# Patient Record
Sex: FEMALE | Race: WHITE
[De-identification: names, ages, dates, MRNs, and addresses within clinical notes are randomized per-mention and may not be internally consistent; named-entity substitution may affect disease eponyms.]

---

## 2018-03-16 ENCOUNTER — HOSPITAL ENCOUNTER (INPATIENT)
Dept: HOSPITAL 26 - MLD | Age: 38
LOS: 2 days | Discharge: HOME | End: 2018-03-18
Attending: OBSTETRICS & GYNECOLOGY | Admitting: OBSTETRICS & GYNECOLOGY
Payer: SELF-PAY

## 2018-03-16 VITALS — HEIGHT: 59 IN | BODY MASS INDEX: 24.6 KG/M2 | WEIGHT: 122 LBS

## 2018-03-16 VITALS — DIASTOLIC BLOOD PRESSURE: 81 MMHG | SYSTOLIC BLOOD PRESSURE: 123 MMHG

## 2018-03-16 DIAGNOSIS — Z3A.39: ICD-10-CM

## 2018-03-16 LAB
ALBUMIN FLD-MCNC: 2.6 G/DL (ref 3.4–5)
ANION GAP SERPL CALCULATED.3IONS-SCNC: 15.2 MMOL/L (ref 8–16)
APPEARANCE UR: CLEAR
AST SERPL-CCNC: 19 U/L (ref 15–37)
BASOPHILS # BLD AUTO: 0.1 K/UL (ref 0–0.22)
BASOPHILS NFR BLD AUTO: 1 % (ref 0–2)
BILIRUB SERPL-MCNC: 0.2 MG/DL (ref 0–1)
BILIRUB UR QL STRIP: NEGATIVE
BUN SERPL-MCNC: 8 MG/DL (ref 7–18)
CHLORIDE SERPL-SCNC: 107 MMOL/L (ref 98–107)
CO2 SERPL-SCNC: 20.6 MMOL/L (ref 21–32)
COLOR UR: YELLOW
CREAT SERPL-MCNC: 0.7 MG/DL (ref 0.6–1.3)
DEPRECATED D DIMER PPP-ACNC: 1500 NG/ML (ref 0–400)
EOSINOPHIL # BLD AUTO: 0.1 K/UL (ref 0–0.4)
EOSINOPHIL NFR BLD AUTO: 1.2 % (ref 0–4)
ERYTHROCYTE [DISTWIDTH] IN BLOOD BY AUTOMATED COUNT: 13.5 % (ref 11.6–13.7)
GFR SERPL CREATININE-BSD FRML MDRD: 122 ML/MIN (ref 90–?)
GLUCOSE SERPL-MCNC: 89 MG/DL (ref 74–106)
GLUCOSE UR STRIP-MCNC: NEGATIVE MG/DL
HCT VFR BLD AUTO: 35.1 % (ref 36–48)
HGB BLD-MCNC: 11.7 G/DL (ref 12–16)
HGB UR QL STRIP: NEGATIVE
LEUKOCYTE ESTERASE UR QL STRIP: NEGATIVE
LYMPHOCYTES # BLD AUTO: 1.9 K/UL (ref 2.5–16.5)
LYMPHOCYTES NFR BLD AUTO: 18.1 % (ref 20.5–51.1)
MCH RBC QN AUTO: 30 PG (ref 27–31)
MCHC RBC AUTO-ENTMCNC: 33 G/DL (ref 33–37)
MCV RBC AUTO: 91 FL (ref 80–94)
MONOCYTES # BLD AUTO: 1 K/UL (ref 0.8–1)
MONOCYTES NFR BLD AUTO: 9.4 % (ref 1.7–9.3)
NEUTROPHILS # BLD AUTO: 7.4 K/UL (ref 1.8–7.7)
NEUTROPHILS NFR BLD AUTO: 70.3 % (ref 42.2–75.2)
NITRITE UR QL STRIP: NEGATIVE
PH UR STRIP: 6 [PH] (ref 5–9)
PLATELET # BLD AUTO: 317 K/UL (ref 140–450)
POTASSIUM SERPL-SCNC: 3.8 MMOL/L (ref 3.5–5.1)
PROTHROMBIN TIME: 9 SECS (ref 10.8–13.4)
RBC # BLD AUTO: 3.86 MIL/UL (ref 4.2–5.4)
SODIUM SERPL-SCNC: 139 MMOL/L (ref 136–145)
WBC # BLD AUTO: 10.5 K/UL (ref 4.8–10.8)

## 2018-03-17 PROCEDURE — 0KQM0ZZ REPAIR PERINEUM MUSCLE, OPEN APPROACH: ICD-10-PCS | Performed by: OBSTETRICS & GYNECOLOGY

## 2018-03-18 LAB
BASOPHILS NFR BLD MANUAL: 0 % (ref 0–2)
EOSINOPHIL NFR BLD MANUAL: 0 % (ref 0–4)
ERYTHROCYTE [DISTWIDTH] IN BLOOD BY AUTOMATED COUNT: 13.5 % (ref 11.6–13.7)
HCT VFR BLD AUTO: 33 % (ref 36–48)
HGB BLD-MCNC: 11.1 G/DL (ref 12–16)
LYMPHOCYTES NFR BLD MANUAL: 10 % (ref 20–46)
MCH RBC QN AUTO: 30 PG (ref 27–31)
MCHC RBC AUTO-ENTMCNC: 34 G/DL (ref 33–37)
MCV RBC AUTO: 91 FL (ref 80–94)
MONOCYTES NFR BLD MANUAL: 7 % (ref 5–12)
PLATELET # BLD AUTO: 279 K/UL (ref 140–450)
RBC # BLD AUTO: 3.64 MIL/UL (ref 4.2–5.4)
WBC # BLD AUTO: 17.6 K/UL (ref 4.8–10.8)

## 2018-03-18 NOTE — NUR
PATIENT HAS BEEN SCREENED AND CATEGORIZED AS LOW NUTRITION RISK. PATIENT WILL BE SEEN WITHIN 
7 DAYS OF ADMISSION.



03/25/18



JENNIFER SPAIN MS, RDN

## 2018-06-10 ENCOUNTER — HOSPITAL ENCOUNTER (INPATIENT)
Dept: HOSPITAL 26 - MED | Age: 38
LOS: 3 days | Discharge: HOME | DRG: 463 | End: 2018-06-13
Attending: INTERNAL MEDICINE | Admitting: INTERNAL MEDICINE
Payer: COMMERCIAL

## 2018-06-10 VITALS — DIASTOLIC BLOOD PRESSURE: 79 MMHG | SYSTOLIC BLOOD PRESSURE: 118 MMHG

## 2018-06-10 VITALS — BODY MASS INDEX: 20.16 KG/M2 | HEIGHT: 59 IN | WEIGHT: 100 LBS

## 2018-06-10 VITALS — DIASTOLIC BLOOD PRESSURE: 94 MMHG | SYSTOLIC BLOOD PRESSURE: 166 MMHG

## 2018-06-10 DIAGNOSIS — N39.0: Primary | ICD-10-CM

## 2018-06-10 DIAGNOSIS — Z88.8: ICD-10-CM

## 2018-06-10 DIAGNOSIS — E87.6: ICD-10-CM

## 2018-06-10 DIAGNOSIS — F41.9: ICD-10-CM

## 2018-06-10 DIAGNOSIS — R45.851: ICD-10-CM

## 2018-06-10 DIAGNOSIS — Z79.899: ICD-10-CM

## 2018-06-10 DIAGNOSIS — Z28.21: ICD-10-CM

## 2018-06-10 DIAGNOSIS — Z78.1: ICD-10-CM

## 2018-06-10 DIAGNOSIS — Z88.4: ICD-10-CM

## 2018-06-10 LAB
ALBUMIN FLD-MCNC: 3.8 G/DL (ref 3.4–5)
ANION GAP SERPL CALCULATED.3IONS-SCNC: 13 MMOL/L (ref 8–16)
APAP SERPL-MCNC: < 0.5 UG/ML (ref 10–30)
APPEARANCE UR: (no result)
AST SERPL-CCNC: 27 U/L (ref 15–37)
BARBITURATES UR QL SCN: (no result) NG/ML
BASOPHILS # BLD AUTO: 0 K/UL (ref 0–0.22)
BASOPHILS NFR BLD AUTO: 0.2 % (ref 0–2)
BENZODIAZ UR QL SCN: (no result) NG/ML
BILIRUB SERPL-MCNC: 0.4 MG/DL (ref 0–1)
BILIRUB UR QL STRIP: NEGATIVE
BUN SERPL-MCNC: 5 MG/DL (ref 7–18)
BZE UR QL SCN: (no result) NG/ML
CANNABINOIDS UR QL SCN: (no result) NG/ML
CHLORIDE SERPL-SCNC: 105 MMOL/L (ref 98–107)
CO2 SERPL-SCNC: 25.3 MMOL/L (ref 21–32)
COLOR UR: YELLOW
CREAT SERPL-MCNC: 0.9 MG/DL (ref 0.6–1.3)
EOSINOPHIL # BLD AUTO: 0 K/UL (ref 0–0.4)
EOSINOPHIL NFR BLD AUTO: 0 % (ref 0–4)
ERYTHROCYTE [DISTWIDTH] IN BLOOD BY AUTOMATED COUNT: 13.1 % (ref 11.6–13.7)
GFR SERPL CREATININE-BSD FRML MDRD: 91 ML/MIN (ref 90–?)
GLUCOSE SERPL-MCNC: 117 MG/DL (ref 74–106)
GLUCOSE UR STRIP-MCNC: NEGATIVE MG/DL
HCT VFR BLD AUTO: 41.2 % (ref 36–48)
HGB BLD-MCNC: 13.7 G/DL (ref 12–16)
HGB UR QL STRIP: (no result)
LEUKOCYTE ESTERASE UR QL STRIP: NEGATIVE
LYMPHOCYTES # BLD AUTO: 0.8 K/UL (ref 2.5–16.5)
LYMPHOCYTES NFR BLD AUTO: 4 % (ref 20.5–51.1)
MCH RBC QN AUTO: 31 PG (ref 27–31)
MCHC RBC AUTO-ENTMCNC: 33 G/DL (ref 33–37)
MCV RBC AUTO: 91.7 FL (ref 80–94)
MONOCYTES # BLD AUTO: 1.1 K/UL (ref 0.8–1)
MONOCYTES NFR BLD AUTO: 5.6 % (ref 1.7–9.3)
NEUTROPHILS # BLD AUTO: 17.9 K/UL (ref 1.8–7.7)
NEUTROPHILS NFR BLD AUTO: 90.2 % (ref 42.2–75.2)
NITRITE UR QL STRIP: NEGATIVE
OPIATES UR QL SCN: (no result) NG/ML
PCP UR QL SCN: (no result) NG/ML
PH UR STRIP: 6 [PH] (ref 5–9)
PLATELET # BLD AUTO: 482 K/UL (ref 140–450)
POTASSIUM SERPL-SCNC: 3.3 MMOL/L (ref 3.5–5.1)
RBC # BLD AUTO: 4.49 MIL/UL (ref 4.2–5.4)
RBC #/AREA URNS HPF: (no result) /HPF (ref 0–5)
SALICYLATES SERPL-MCNC: < 2.8 MG/DL (ref 2.8–20)
SODIUM SERPL-SCNC: 140 MMOL/L (ref 136–145)
WBC # BLD AUTO: 19.9 K/UL (ref 4.8–10.8)
WBC,URINE: (no result) /HPF (ref 0–5)

## 2018-06-10 PROCEDURE — G0482 DRUG TEST DEF 15-21 CLASSES: HCPCS

## 2018-06-10 PROCEDURE — 99285 EMERGENCY DEPT VISIT HI MDM: CPT

## 2018-06-10 PROCEDURE — G0480 DRUG TEST DEF 1-7 CLASSES: HCPCS

## 2018-06-10 PROCEDURE — G0378 HOSPITAL OBSERVATION PER HR: HCPCS

## 2018-06-10 NOTE — NUR
PATIENT IS CALM AND COOPERATIVE. SHE SOFT SPOKEN AND RESPONDS WELL TO QUESTION 
ASKED. SHE CURRENTLY IS RESTING.

## 2018-06-10 NOTE — NUR
PATIENTS PULSE WAS AT 130s-140s. she expressed some anxiety and nervousness. 
she stated that she wished chely could talk to her brother who is currently 
caring for her 3 month old son. Pt called her brother and talked for a few 
minutes. She expressed relief and her pulse was down to 111 after reassessment.

## 2018-06-10 NOTE — NUR
PLACEMENT PACKETS HAVE BEEN SENT TO [facility], [facility], [facility], AND [facility]. ALL BEDS ARE CURRENTLY FULL BUT WILL HOLD PACKET FOR POSSIBLE BED. 



[facility], AND [facility] ARE NOT TAKING WAITLIST 
AT THIS TIME. 



WE WILL CONTINUE TO SEARCH FOR PLACEMENT.

## 2018-06-10 NOTE — NUR
PATIENT CAME IN TO DAY BY [facility]. AURELIANO IS ON A 5150 HOLD DUE TO SUICIDE 
IDEATION. PATIENT STATES THAT SHE HAS HISTORY OF DEPRESSION AND ANXIETY. 
AURELIANO HAS 2 SUPERFICIAL WOUNDS ON BOTH WRISTS. [facility] STATES THAT SHE TRIED TO 
CUT HER WRISTS. PATIENT IS ALERT AND ORIENTED Xs 4. PATIENT YULIET ANY PAIN.

-------------------------------------------------------------------------------

Addendum: 06/10/18 at 1740 [facility]

-------------------------------------------------------------------------------

PATIENT CAME IN TO DAY BY [facility]. AURELIANO IS ON A 5150 HOLD DUE TO SUICIDE 
IDEATION. SHE WAS PUT ON A HOLD BY MARIA TERESA MAYS ON 6/10/18 AT 1310 FOR 
DANGER TO SELF. KACY'S BROTHER CALLED DUE TO PATIENT STATING SHE WANTED TO 
KILL HERSELF. PATIENT STATES THAT SHE HAS HISTORY OF DEPRESSION AND ANXIETY. 
AURELIANO HAS 2 SUPERFICIAL WOUNDS ON BOTH WRISTS. [facility] STATES THAT SHE TRIED TO 
CUT HER WRISTS. PATIENT IS ALERT AND ORIENTED Xs 4. PATIENT YULIET ANY PAIN.

## 2018-06-10 NOTE — NUR
DR COLÓN INFORMED OF TACCHYCARDIA WHEN PT GETS ANXIOUS, NO ORDERS RECEIVED.PT 
ABLE TO RELAX AFTER TALKING TOPT AND ST . WILL CONT TO MONITOR PT 
CLOSELY.

## 2018-06-10 NOTE — NUR
[facility] RECEIVED A CALL FROM WILFREDO IN THE ED, SHE 
HAS REFERRED PATIENT FOR ASSISTANCE WITH PLACEMENT. WE'RE JUST WAITING ON COPY 
OF HOLD TO ASSIST.

## 2018-06-10 NOTE — NUR
Admitted from ER TO [facility] SURGICAL UNIT, with chief complaint of SUICIDAL IDEATION  , 36 y/o 
,Female, Cooperative, AWAKE, A/OX4. RESPIRATION EVEN AND UNLABORED, ABLE TO HAVE BM EVERYDAY 
BUT WITH A LITTLE BURNING SENSATION WHEN SHE VOIDS. ENCOURAGED TO DRINK MORE WATER. 
VERBALIZED HAVING 3 WEEKS OF MENSTRUATION BUT NOT HEAVY, DESCRIBED AS MORE THAN SPOTTING. 
NOTED TINY LACERATION IN THE RIGHT WRIST, AS PER PATIENT  WHEN  SHE USE A NEEDLE AND SCRATCH 
ON THE LEFT WRIST, USING A SCISSOR TRYING TO CUT WRISTS, ALL MARKS DRY AND INTACT. CLAIMED 
THIS HAPPENED WHEN SHE LOST CONTROL OF HERSELF BUT NOT HEARING VOICES TO DO IT. NO SUICIDAL 
THOUGHTS AT THIS TIME. DENIES PAIN 0/10.

oriented to call light, bed, phone,television, bathroom, smoking policy,

visiting hours, procedures, ID bracelet on. Belongings list checked.

## 2018-06-10 NOTE — NUR
PATIENT SEEMED A LITTLE ANXIOUS. WENT TO BEDSIDE AND PT STARTED SHAKING. SHE 
STATED THAT SHE WAS IN FEAR BECAUSE SHE THOUGHT SHE HAD TO GO TO custodial. ANOTHER 
RN AND I REAASSURED HER THAT SHE WAS NOT IN TROUBLE AND THAT WE ARE HERE TO 
TAKE CARE OF HER. SHE EXPRESSED HER FEELINGS OF FEAR BY NOT BEING ABLE TO SEE 
HER DAUGHTER FOR A LONG TIME AND WANTED HER TO COME TO THE HOSPITAL. WE LET HER 
KNOW THAT ONCE SHE IS SETTLED IN AND OUT OF THE ER SHE COULD SEE HER. SHE 
AGREED AND HAD RELIEF. AFTER RECIEVING INFORMATION THAT SHE WAS NOT IN TROUBLE 
HER ANXIETY DECREASED AND SHE BEGAN TO STOP SHAKING AND BEGAN CALMING DOWN. SHE 
STATED THAT SHE WAS HUNGRY AND A MEAL WAS ORDERED. HER PULSE IS AT A 109. BP IS 
114/67 AND O2 IS  96 PERCENT.  SHE EXPRESSED THAT SHE NEEDED TO GET HEALTHY AND 
SHE WILL DO WHATEVER IT TAKES TO GET HER THERE ESPECIALLY FOR HER DAUGHTER. SHE 
WANTED TO FOCUS ON HERSELF AND GET THEARPY.

## 2018-06-10 NOTE — NUR
AMBULATED TO BR TO VOID, LOOK AT HERSELF IN THE MIRROR FOR A FEW MINUTES AND THEN WENT BACK 
TO BED.

## 2018-06-10 NOTE — NUR
Patient will be admitted to care of [facility]. Admited to MED SURG  Will go to 
room. Belongings list completed.  Report to .

-------------------------------------------------------------------------------

Addendum: 06/10/18 at 1928 by MEDD

-------------------------------------------------------------------------------

2 MEDICATION BOTTLES FOR ATIVAN, AND ZOLOFT WERE GIVEN TO CHARGE NURSE 
ACCEPTING REPORT, CLOTHES AND BELONGINGS WITH SECURITY.

## 2018-06-11 VITALS — DIASTOLIC BLOOD PRESSURE: 77 MMHG | SYSTOLIC BLOOD PRESSURE: 117 MMHG

## 2018-06-11 VITALS — DIASTOLIC BLOOD PRESSURE: 69 MMHG | SYSTOLIC BLOOD PRESSURE: 108 MMHG

## 2018-06-11 VITALS — SYSTOLIC BLOOD PRESSURE: 128 MMHG | DIASTOLIC BLOOD PRESSURE: 71 MMHG

## 2018-06-11 VITALS — DIASTOLIC BLOOD PRESSURE: 76 MMHG | SYSTOLIC BLOOD PRESSURE: 109 MMHG

## 2018-06-11 VITALS — DIASTOLIC BLOOD PRESSURE: 65 MMHG | SYSTOLIC BLOOD PRESSURE: 110 MMHG

## 2018-06-11 LAB
ANION GAP SERPL CALCULATED.3IONS-SCNC: 12.6 MMOL/L (ref 8–16)
BASOPHILS # BLD AUTO: 0.1 K/UL (ref 0–0.22)
BASOPHILS NFR BLD AUTO: 0.4 % (ref 0–2)
BUN SERPL-MCNC: 11 MG/DL (ref 7–18)
CHLORIDE SERPL-SCNC: 103 MMOL/L (ref 98–107)
CO2 SERPL-SCNC: 25.3 MMOL/L (ref 21–32)
CREAT SERPL-MCNC: 0.7 MG/DL (ref 0.6–1.3)
EOSINOPHIL # BLD AUTO: 0 K/UL (ref 0–0.4)
EOSINOPHIL NFR BLD AUTO: 0.3 % (ref 0–4)
ERYTHROCYTE [DISTWIDTH] IN BLOOD BY AUTOMATED COUNT: 13.2 % (ref 11.6–13.7)
GFR SERPL CREATININE-BSD FRML MDRD: 122 ML/MIN (ref 90–?)
GLUCOSE SERPL-MCNC: 132 MG/DL (ref 74–106)
HCT VFR BLD AUTO: 43 % (ref 36–48)
HGB BLD-MCNC: 14.4 G/DL (ref 12–16)
LYMPHOCYTES # BLD AUTO: 2.4 K/UL (ref 2.5–16.5)
LYMPHOCYTES NFR BLD AUTO: 18.2 % (ref 20.5–51.1)
MCH RBC QN AUTO: 31 PG (ref 27–31)
MCHC RBC AUTO-ENTMCNC: 33 G/DL (ref 33–37)
MCV RBC AUTO: 92.3 FL (ref 80–94)
MONOCYTES # BLD AUTO: 1 K/UL (ref 0.8–1)
MONOCYTES NFR BLD AUTO: 7.5 % (ref 1.7–9.3)
NEUTROPHILS # BLD AUTO: 9.8 K/UL (ref 1.8–7.7)
NEUTROPHILS NFR BLD AUTO: 73.6 % (ref 42.2–75.2)
PLATELET # BLD AUTO: 503 K/UL (ref 140–450)
POTASSIUM SERPL-SCNC: 3.9 MMOL/L (ref 3.5–5.1)
RBC # BLD AUTO: 4.66 MIL/UL (ref 4.2–5.4)
SODIUM SERPL-SCNC: 137 MMOL/L (ref 136–145)
WBC # BLD AUTO: 13.3 K/UL (ref 4.8–10.8)

## 2018-06-11 RX ADMIN — ENOXAPARIN SODIUM SCH MG: 40 INJECTION SUBCUTANEOUS at 09:23

## 2018-06-11 NOTE — NUR
CONTACTED [facility] ETS, [facility], [facility], [facility]. THERE ARE CURRENTLY NO BEDS AVAILABLE AT THIS TIME. 
PLACEMENT PACKETS WILL BE SENT TO [facility] ETS AND TE[facility] 
F [facility].

## 2018-06-11 NOTE — NUR
Caller is calling on behalf of her mom. Patient states that Dr. Stout Needs to put an order in for occupational therapy for in home care for her mom. Caller states that she can not make an appointment without Dr. Stout sending over an order. Caller would like a call back from the nurse so she can explain more in detail.    RECEIVED BEDSIDE REPORT FROM NIGHT SHIFT NURSE. PATIENT IS AWAJKE, ALERT AND ORIENTEDX4. NO 
SIGNS OF DISTRESS ON ROOM AIR. NO IV SITE, PATIENT REFUSED. SHE HAS SCRATCHES ON BOTH 
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 SHE IS AMBULATORY. GAIT IS 
STEADY. BED IN LOW POSITION. 1:1 SITTER AT BEDSIDE. WILL CONTINUE TO MONITOR THE PATIENT.

## 2018-06-11 NOTE — NUR
ADMINISTERED LOVENOX ON L ARM. PATIENT TOLERATED WELL. BED IN LOW POSITION. WILL CONTINUE TO 
MONITOR THE PATIENT. 1:1 SITTER AT BEDSIDE.

## 2018-06-11 NOTE — NUR
RECEIVED PATIENT ROAMING AROUND THE ROOM WITH CONSTANT WATCH OF SITTER. PATIENT IS ON 1:1 
SITTER FOR SUICIDAL IDEATION. SUICIDAL PRECAUTION IMPLEMENTED. BED IN LOW POSITION.  WILL 
CONTINUE TO MONITOR.

## 2018-06-11 NOTE — NUR
SEEN PATIENT SITTING ON TOP BED AWAKE DOING ROCKING MOTION. ENCOURAGE TO VERBALIZED FEELING. 
REALITY ORIENTATION PROVIDED. SITTER IN CONSTANT WATCH BY THE DOOR FOR SAFETY. SUICIDAL 
PRECAUTION PROVIDED. WILL CONTINUE TO MONITOR.

## 2018-06-11 NOTE — NUR
PATIENT WITH FAMILY. NO SIGNS OF DISTRESS. 1:1 SITTER AT BEDSIDE. WILL CONTINUE TO MONITOR 
PATIENT.

## 2018-06-11 NOTE — NUR
Made several calls for placement , but at this time there are no vacancy, will continue to 
try to place pt.

## 2018-06-11 NOTE — NUR
PATIENT IS FEELING BETTER. A LITTLE LESS ANXIOUS. SHE DID NOT WANT ME TO ASK THE DOCTOR FOR 
ANXIETY MEDICATIONS. PATIENT ALMOST DONE EATING HER LUNCH. BED IN LOW POSITION. 1:1 SITTER 
AT BEDSIDE.

## 2018-06-11 NOTE — NUR
FAMILY AT BEDSIDE. PATIENT IS ANXIOUS. ADMINISTERED XANAX AS ORDERED. BED IN LOW POSITION 
1:1 SITTER AT BEDSIDE. WILL CONTINUE TO MONITOR THE PATIENT.

## 2018-06-11 NOTE — NUR
PATIENT IS ANXIOUS. CHECKED VITALS, VITALS ARE WITHIN NORMAL LIMITS. PATIENT SPOKE TO HER 
BROTHER AND SHE IS MORE RELAXED. SHE JUST WANTS TO SEE HER BABY. FAMILY WILL VISIT IN ABOUT 
2HRS. BED IN LOW POSITION. WILL CONTINUE TO MONITOR THE PATIENT.

## 2018-06-12 VITALS — SYSTOLIC BLOOD PRESSURE: 143 MMHG | DIASTOLIC BLOOD PRESSURE: 96 MMHG

## 2018-06-12 VITALS — SYSTOLIC BLOOD PRESSURE: 109 MMHG | DIASTOLIC BLOOD PRESSURE: 75 MMHG

## 2018-06-12 VITALS — DIASTOLIC BLOOD PRESSURE: 87 MMHG | SYSTOLIC BLOOD PRESSURE: 127 MMHG

## 2018-06-12 LAB
ANION GAP SERPL CALCULATED.3IONS-SCNC: 15 MMOL/L (ref 8–16)
BASOPHILS # BLD AUTO: 0.1 K/UL (ref 0–0.22)
BASOPHILS NFR BLD AUTO: 0.4 % (ref 0–2)
BUN SERPL-MCNC: 10 MG/DL (ref 7–18)
CHLORIDE SERPL-SCNC: 103 MMOL/L (ref 98–107)
CO2 SERPL-SCNC: 24.5 MMOL/L (ref 21–32)
CREAT SERPL-MCNC: 0.9 MG/DL (ref 0.6–1.3)
EOSINOPHIL # BLD AUTO: 0 K/UL (ref 0–0.4)
EOSINOPHIL NFR BLD AUTO: 0.2 % (ref 0–4)
ERYTHROCYTE [DISTWIDTH] IN BLOOD BY AUTOMATED COUNT: 13 % (ref 11.6–13.7)
GFR SERPL CREATININE-BSD FRML MDRD: 91 ML/MIN (ref 90–?)
GLUCOSE SERPL-MCNC: 120 MG/DL (ref 74–106)
HCT VFR BLD AUTO: 43.4 % (ref 36–48)
HGB BLD-MCNC: 14.4 G/DL (ref 12–16)
LYMPHOCYTES # BLD AUTO: 1.7 K/UL (ref 2.5–16.5)
LYMPHOCYTES NFR BLD AUTO: 10.8 % (ref 20.5–51.1)
MCH RBC QN AUTO: 31 PG (ref 27–31)
MCHC RBC AUTO-ENTMCNC: 33 G/DL (ref 33–37)
MCV RBC AUTO: 92.2 FL (ref 80–94)
MONOCYTES # BLD AUTO: 1.2 K/UL (ref 0.8–1)
MONOCYTES NFR BLD AUTO: 7.4 % (ref 1.7–9.3)
NEUTROPHILS # BLD AUTO: 12.6 K/UL (ref 1.8–7.7)
NEUTROPHILS NFR BLD AUTO: 81.2 % (ref 42.2–75.2)
PLATELET # BLD AUTO: 487 K/UL (ref 140–450)
POTASSIUM SERPL-SCNC: 3.5 MMOL/L (ref 3.5–5.1)
RBC # BLD AUTO: 4.71 MIL/UL (ref 4.2–5.4)
SODIUM SERPL-SCNC: 139 MMOL/L (ref 136–145)
WBC # BLD AUTO: 15.5 K/UL (ref 4.8–10.8)

## 2018-06-12 RX ADMIN — ENOXAPARIN SODIUM SCH MG: 40 INJECTION SUBCUTANEOUS at 08:10

## 2018-06-12 NOTE — NUR
PATIENT IS EATING. FAMILY AT BEDSIDE. NO SIGNS OF DISTRESS AT THIS TIME. WILL CONTINUE TO 
MONITOR THE PATIENT. 1:1 SITTER AT BEDSIDE

## 2018-06-12 NOTE — NUR
PATIENT SEEN SITTING IN CHAIR NEXT TO 1:1 SITTER, TALKING TO THEM. NO SIGNS AND SYMPTOMS OF 
DISTRESS NOTED. WILL CONTINUE TO MONITOR

## 2018-06-12 NOTE — NUR
PATIENT REPORT RECEIVED FROM MORNING NURSE AT BEDSIDE. PATIENT IS AWAKE AND ALERT. NO SIGNS 
AND SYMPTOMS OF DISTRESS NOTED. PATIENT'S FAMILY AT BEDSIDE. NO IV SITE NOTED DUE TO 
PATIENT'S REFUSAL. SAFETY PRECAUTIONS IN PLACE. 1:1 SITTER PRESENT. WILL CONTINUE TO MONITOR

## 2018-06-12 NOTE — NUR
PATIENT WALKING AROUND BEDSIDE. NO COMPLAINTS AT THIS TIME. JUST STILL WISHING SHE CAN SEE 
HER BABY. NO SIGNS OF DISTRESS ON ROOM AIR.  1:1 SITTER AT BEDSIDE.

## 2018-06-12 NOTE — NUR
SEEN PATIENT AWAKE ON SITTING ON BED. PATIENT WAS QUIET WITH NO SIGN OF AGGRESSION. PATIENT 
SITTER ON CONSTANT WATCH FOR SAFETY. WILL CONTINUE TO MONITOR.

## 2018-06-12 NOTE — NUR
ADMINISTERED MEDS. PATIENT TOLERATED WELL. ADMINISTERED PRN XANAX PATIENT IS REALLY ANXIOUS 
TALKING ABOUT HOW SHE NEEDS TO BE WITH HER BABY AND SHE KEEPS WALKING BACK AND FOURTH AROUND 
THE ROOM, AND ROCKING HER BODY. KEEPS REPEATING "I NEED TO BE WITH MY BABY" BED IN LOW 
POSITION. 1:1 SITTER AT BEDSIDE. WILL CONTINUE TO MONITOR THE PATIENT.

## 2018-06-12 NOTE — NUR
Many calls were made , trying to place pt , BUt there were no vacancy , will endorsed to 
oncoming shift to continue to find placement for pt.

## 2018-06-12 NOTE — NUR
PATIENT CURRENTLY ASKING TO SEE HER DAUGHTER. EXPLAINED TO HER SHE HAS TO GET BETTER FIRST. 
SHE WANTS TO SEE HER DAUGHTER AND GIVE HER "KISSES AND HUGS". SHE ATTEMPTED TO WALK OUT THE 
ROOM BUT I HAD TO EXPLAIN TO HER THAT SHE IS NOT ABLE TO LEAVE DO TO THE 5150. WILL CONTINUE 
TO MONITOR THE PATIENT.

## 2018-06-12 NOTE — NUR
6/12/18 

RD INITIAL ASSESSMENT COMPLETED



PLEASE REFER TO NUTRITION ASSESSMENT UNDER CARE ACTIVITY FOR ESTIMATED NUTRITIONAL NEEDS. 



1. CONTINUE REGULAR DIET AS TOLERATED 

2. RD TO FOLLOW-UP 5-7 DAYS, LOW RISK 



ISIDRO [address]

## 2018-06-12 NOTE — NUR
RECEIVED BEDSIDE REPORT FROM NIGHT SHIFT NURSE. PATIENT IS AWAKE, SHE IS ALERT AND 
ORIENTEDX4. NO SIGNS OF DISTRESS ON ROOM AIR. SHE HAS SOME ANXIETY. SKIN HAS LITTLE CUTS ON 
BILATERAL WRISTS. NO IV IN PLACE, PATIENT REFUSED. BED IN LOW POSITION. 1:1 SITTER AT 
BEDSIDE.

## 2018-06-12 NOTE — NUR
MEDICATION EDUCATION GIVEN. PATIENT VERBALIZED UNDERSTANDING. MEDICATION EDUCATION GIVEN AS 
ORDERED. PATIENT TOLERATED WELL. WILL CONTINUE TO MONITOR

## 2018-06-13 VITALS — DIASTOLIC BLOOD PRESSURE: 86 MMHG | SYSTOLIC BLOOD PRESSURE: 125 MMHG

## 2018-06-13 VITALS — SYSTOLIC BLOOD PRESSURE: 106 MMHG | DIASTOLIC BLOOD PRESSURE: 76 MMHG

## 2018-06-13 RX ADMIN — ENOXAPARIN SODIUM SCH MG: 40 INJECTION SUBCUTANEOUS at 09:54

## 2018-06-13 NOTE — NUR
PATIENT AGITATED. GOT OUT OF ROOM. 1:1 SITTER FOLLOWED PATIENT. SECURITY CALLED. NOTIFIED 
DR. VENTURA, ORDERS RECEIVED.

## 2018-06-13 NOTE — NUR
RECEIVED PT REPORT FROM NIGHT SHIFT NURSE AT BEDSIDE. PATIENT IS SLEEPING IN BED. NO SIGNS 
AND SYMPTOMS OF DISTRESS NOTED. IV NOTED TO THE RIGHT FA, 22G. PATENT AND INTACT. SAFETY 
PRECAUTIONS IN PLACE. 1:1 SITTER AT BEDSIDE. WILL CONTINUE TO MONITOR

## 2018-06-13 NOTE — NUR
PT DISCHARGED PER MD ORDER. OK WITH DR ALVAREZ. MADE PT AND HER BROTHER AWARE THAT PT NEEDS TO 
FOLLOW UP WITH PCP AND DR ALVAREZ PSYCHIATRIST WITHIN 1 WK OF DC. PT WAS PROVIDED WITH DR ALVAREZ'S BUSINESS CARD AND PHONE #. DISCHARGE AND MEDICATION TEACHING PROVIDED. PT VERBALIZED 
UNDERSTANDING. IV DC'D, TIP INTACT, PRESSURE APPLIED. RX AND MEDICATION STORED IN PHARM WAS 
GIVEN TO PT. PT LEFT WITH ALL HER BELONGING AND IN STABLE CONDITION.

## 2018-06-13 NOTE — NUR
CALLED PT'S BROTHER. MADE HIM AWARE OF DISCHARGE ORDER FROM MD. HE SAID HE WILL COME TO PICK 
UP PT AFTER LUNCH AROUND 1PM.

## 2018-06-13 NOTE — NUR
PATIENT REPORT GIVEN TO MORNING NURSE AT BEDSIDE. PATIENT IS IN STABLE CONDITION

-------------------------------------------------------------------------------

Addendum: 06/13/18 at 0745 by Epifanio Torres RN

-------------------------------------------------------------------------------

1:1 JANET PRESENT WITH PATIENT

## 2018-06-13 NOTE — NUR
RECEIVED CALL FROM [facility] AGENT VIDHYA. VIDHYA STATED THAT HE STILL HAS NOT BEEN 
ABLE TO FIND AN AVAILABLE BED.

## 2018-06-13 NOTE — NUR
CHENG ORDER OVERRIDED IN [facility]. 

-------------------------------------------------------------------------------

Addendum: 06/13/18 at 0801 by Epifanio Torres RN

-------------------------------------------------------------------------------

COSIGNED BY DILLAN DEE

## 2018-06-13 NOTE — NUR
CHECKED ON PATIENT. PATIENT IS ASLEEP. NO SIGNS AND SYMPTOMS OF DISTRESS NOTED. 1:1 SITTER 
PRESENT. WILL CONTINUE TO MONITOR

## 2018-06-13 NOTE — NUR
PATIENT IS AWAKE SITTING QUIETLY IN BED. SAFETY PRECAUTIONS IN PLACE. 1:1 SITTER PRESENT. 
WILL CONTINUE TO MONITOR

## 2018-06-13 NOTE — NUR
CHECKED ON PATIENT. PATIENT SITTING QUIETLY IN BED. NO SIGNS AND SYMPTOMS OF DISTRESS NOTED. 
1:1 SITTER PRESENT. SAFETY PRECAUTIONS IN PLACE. WILL CONTINUE TO MONITOR

## 2018-06-13 NOTE — NUR
CALLED PATIENT'S BROTHER, JENNA ON THE PHONE. HAD PATIENT SPEAK TO BROTHER TO HELP CALM 
PATIENT DOWN. SECURITY PRESENT WITH PATIENT IN ROOM

## 2018-06-30 ENCOUNTER — HOSPITAL ENCOUNTER (INPATIENT)
Dept: HOSPITAL 1 - ED | Age: 38
LOS: 3 days | Discharge: HOME | DRG: 751 | End: 2018-07-03
Attending: INTERNAL MEDICINE | Admitting: INTERNAL MEDICINE
Payer: COMMERCIAL

## 2018-06-30 VITALS
BODY MASS INDEX: 20.76 KG/M2 | HEIGHT: 59.02 IN | BODY MASS INDEX: 20.76 KG/M2 | WEIGHT: 102.98 LBS | HEIGHT: 59.02 IN | WEIGHT: 102.98 LBS

## 2018-06-30 DIAGNOSIS — F32.1: Primary | ICD-10-CM

## 2018-06-30 DIAGNOSIS — R45.851: ICD-10-CM

## 2018-06-30 DIAGNOSIS — F41.9: ICD-10-CM

## 2018-06-30 LAB
ALBUMIN SERPL-MCNC: 3.8 G/DL (ref 3.4–5)
ALP SERPL-CCNC: 98 U/L (ref 46–116)
ALT SERPL-CCNC: 39 U/L (ref 14–59)
AMPHETAMINES UR QL SCN: (no result)
AMYLASE SERPL-CCNC: 40 U/L (ref 25–115)
AST SERPL-CCNC: 31 U/L (ref 15–37)
BASOPHILS NFR BLD: 0.3 % (ref 0–2)
BILIRUB SERPL-MCNC: 0.46 MG/DL (ref 0.2–1)
BUN SERPL-MCNC: 4 MG/DL (ref 7–18)
CALCIUM SERPL-MCNC: 9.3 MG/DL (ref 8.5–10.1)
CHLORIDE SERPL-SCNC: 105 MMOL/L (ref 98–107)
CO2 SERPL-SCNC: 21.5 MMOL/L (ref 21–32)
CREAT SERPL-MCNC: 1.2 MG/DL (ref 0.6–1)
ERYTHROCYTE [DISTWIDTH] IN BLOOD BY AUTOMATED COUNT: 13.2 % (ref 11.5–14.5)
GFR SERPLBLD BASED ON 1.73 SQ M-ARVRAT: 54 ML/MIN
GLUCOSE SERPL-MCNC: 84 MG/DL (ref 74–106)
HDLC SERPL-MCNC: 56 MG/DL (ref 40–60)
LIPASE SERPL-CCNC: 108 IU/L (ref 73–393)
MAGNESIUM SERPL-MCNC: 1.9 MG/DL (ref 1.8–2.4)
MICROSCOPIC UR-IMP: NO
PLATELET # BLD: 534 X10^3MCL (ref 130–400)
POTASSIUM SERPL-SCNC: 3.4 MMOL/L (ref 3.5–5.1)
PROT SERPL-MCNC: 7 G/DL (ref 6.4–8.2)
RBC # UR STRIP.AUTO: NEGATIVE /UL
SODIUM SERPL-SCNC: 143 MMOL/L (ref 136–145)
T4 SERPL-MCNC: 8.8 UG/DL (ref 4.7–13.3)
UA SPECIFIC GRAVITY: 1.01 (ref 1–1.03)

## 2018-06-30 PROCEDURE — G0480 DRUG TEST DEF 1-7 CLASSES: HCPCS

## 2018-07-01 VITALS — DIASTOLIC BLOOD PRESSURE: 52 MMHG | SYSTOLIC BLOOD PRESSURE: 105 MMHG

## 2018-07-01 VITALS — SYSTOLIC BLOOD PRESSURE: 109 MMHG | DIASTOLIC BLOOD PRESSURE: 70 MMHG

## 2018-07-02 VITALS — DIASTOLIC BLOOD PRESSURE: 55 MMHG | SYSTOLIC BLOOD PRESSURE: 90 MMHG

## 2018-07-02 VITALS — DIASTOLIC BLOOD PRESSURE: 68 MMHG | SYSTOLIC BLOOD PRESSURE: 100 MMHG

## 2018-07-02 VITALS — SYSTOLIC BLOOD PRESSURE: 99 MMHG | DIASTOLIC BLOOD PRESSURE: 61 MMHG

## 2018-07-03 VITALS — DIASTOLIC BLOOD PRESSURE: 56 MMHG | SYSTOLIC BLOOD PRESSURE: 103 MMHG

## 2018-07-03 VITALS — SYSTOLIC BLOOD PRESSURE: 102 MMHG | DIASTOLIC BLOOD PRESSURE: 64 MMHG

## 2018-07-04 ENCOUNTER — HOSPITAL ENCOUNTER (INPATIENT)
Dept: HOSPITAL 1 - ED | Age: 38
LOS: 1 days | Discharge: HOME | DRG: 561 | End: 2018-07-05
Attending: INTERNAL MEDICINE | Admitting: INTERNAL MEDICINE
Payer: COMMERCIAL

## 2018-07-04 VITALS
WEIGHT: 102.36 LBS | HEIGHT: 59.02 IN | BODY MASS INDEX: 20.64 KG/M2 | HEIGHT: 59.02 IN | WEIGHT: 102.36 LBS | BODY MASS INDEX: 20.64 KG/M2

## 2018-07-04 VITALS — DIASTOLIC BLOOD PRESSURE: 51 MMHG | SYSTOLIC BLOOD PRESSURE: 97 MMHG

## 2018-07-04 DIAGNOSIS — F32.1: ICD-10-CM

## 2018-07-04 DIAGNOSIS — R45.851: ICD-10-CM

## 2018-07-04 LAB
ALBUMIN SERPL-MCNC: 2.9 G/DL (ref 3.4–5)
ALP SERPL-CCNC: 78 U/L (ref 46–116)
ALT SERPL-CCNC: 31 U/L (ref 14–59)
AST SERPL-CCNC: 24 U/L (ref 15–37)
BASOPHILS NFR BLD: 0.2 % (ref 0–2)
BILIRUB SERPL-MCNC: 0.25 MG/DL (ref 0.2–1)
BUN SERPL-MCNC: 11 MG/DL (ref 7–18)
CALCIUM SERPL-MCNC: 7.8 MG/DL (ref 8.5–10.1)
CHLORIDE SERPL-SCNC: 110 MMOL/L (ref 98–107)
CHOLEST SERPL-MCNC: 120 MG/DL (ref ?–200)
CO2 SERPL-SCNC: 24.7 MMOL/L (ref 21–32)
CREAT SERPL-MCNC: 0.7 MG/DL (ref 0.6–1)
ERYTHROCYTE [DISTWIDTH] IN BLOOD BY AUTOMATED COUNT: 12.8 % (ref 11.5–14.5)
GFR SERPLBLD BASED ON 1.73 SQ M-ARVRAT: > 60 ML/MIN
GLUCOSE SERPL-MCNC: 83 MG/DL (ref 74–106)
PLATELET # BLD: 357 X10^3MCL (ref 130–400)
POTASSIUM SERPL-SCNC: 3 MMOL/L (ref 3.5–5.1)
PROT SERPL-MCNC: 5.7 G/DL (ref 6.4–8.2)
SODIUM SERPL-SCNC: 143 MMOL/L (ref 136–145)

## 2018-07-04 PROCEDURE — G0480 DRUG TEST DEF 1-7 CLASSES: HCPCS

## 2018-07-05 VITALS — SYSTOLIC BLOOD PRESSURE: 96 MMHG | DIASTOLIC BLOOD PRESSURE: 55 MMHG

## 2018-07-05 VITALS — SYSTOLIC BLOOD PRESSURE: 108 MMHG | DIASTOLIC BLOOD PRESSURE: 63 MMHG

## 2018-07-05 VITALS — DIASTOLIC BLOOD PRESSURE: 59 MMHG | SYSTOLIC BLOOD PRESSURE: 95 MMHG

## 2018-07-05 VITALS — SYSTOLIC BLOOD PRESSURE: 96 MMHG | DIASTOLIC BLOOD PRESSURE: 61 MMHG

## 2018-07-05 LAB — AMPHETAMINES UR QL SCN: (no result)
